# Patient Record
Sex: MALE | Race: OTHER | NOT HISPANIC OR LATINO | Employment: FULL TIME | URBAN - METROPOLITAN AREA
[De-identification: names, ages, dates, MRNs, and addresses within clinical notes are randomized per-mention and may not be internally consistent; named-entity substitution may affect disease eponyms.]

---

## 2024-09-18 ENCOUNTER — OFFICE VISIT (OUTPATIENT)
Dept: URGENT CARE | Facility: CLINIC | Age: 24
End: 2024-09-18
Payer: COMMERCIAL

## 2024-09-18 VITALS
WEIGHT: 201 LBS | SYSTOLIC BLOOD PRESSURE: 122 MMHG | HEART RATE: 80 BPM | TEMPERATURE: 97.5 F | HEIGHT: 70 IN | RESPIRATION RATE: 19 BRPM | BODY MASS INDEX: 28.77 KG/M2 | OXYGEN SATURATION: 100 % | DIASTOLIC BLOOD PRESSURE: 80 MMHG

## 2024-09-18 DIAGNOSIS — H93.11 TINNITUS OF RIGHT EAR: ICD-10-CM

## 2024-09-18 DIAGNOSIS — R55 VASOVAGAL SYNCOPE: ICD-10-CM

## 2024-09-18 DIAGNOSIS — H93.8X1 EAR FULLNESS, RIGHT: ICD-10-CM
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
## 2024-09-18 PROCEDURE — 3079F DIAST BP 80-89 MM HG: CPT | Performed by: STUDENT IN AN ORGANIZED HEALTH CARE EDUCATION/TRAINING PROGRAM

## 2024-09-18 PROCEDURE — 3074F SYST BP LT 130 MM HG: CPT | Performed by: STUDENT IN AN ORGANIZED HEALTH CARE EDUCATION/TRAINING PROGRAM

## 2024-09-18 PROCEDURE — 99204 OFFICE O/P NEW MOD 45 MIN: CPT | Performed by: STUDENT IN AN ORGANIZED HEALTH CARE EDUCATION/TRAINING PROGRAM

## 2024-09-18 RX ORDER — DEXAMETHASONE SODIUM PHOSPHATE 10 MG/ML
10 INJECTION INTRAMUSCULAR; INTRAVENOUS ONCE
Status: COMPLETED | OUTPATIENT
Start: 2024-09-18 | End: 2024-09-18

## 2024-09-18 RX ADMIN — DEXAMETHASONE SODIUM PHOSPHATE 10 MG: 10 INJECTION INTRAMUSCULAR; INTRAVENOUS at 19:10

## 2024-09-19 NOTE — PROGRESS NOTES
"Subjective:   Librado England is a 24 y.o. male who presents for Ear Pain (This morning ) and Spider Bite (Today )      HPI:  24-year-old male presents to urgent care with his mother for numerous bug bites to the right side of his neck and right shoulder that he noticed today.  States that they are very itchy.  He states he did stay at the [facility] hotel about 1 week ago.  Does not notice any bedbugs in his house.  He does play volleyball and was exposed to grass and did dive into the grass within the past few days.  States he did have some fullness sensation to the right ear and was not able to pop the ear with swallowing and chewing gum.  Eventually the ear did pop up but he is having some ringing to the right ear that is constant.  Not experiencing any fever, chills, blurred vision, double vision, headache, shortness of breath, chest pain.  Does report that he did have a syncopal episode yesterday before any of the symptoms started.  He was at [employer] training class and saw some graphic images and passed out during that.  He has not had any syncopal episode today or felt lightheaded.    Medications:    dexamethasone    Allergies: Patient has no known allergies.    Problem List: Librado England does not have a problem list on file.    Surgical History:  No past surgical history on file.    Past Social Hx: Librado England  reports that he has never smoked. He has never used smokeless tobacco. He reports that he does not drink alcohol and does not use drugs.     Past Family Hx:  Librado England family history is not on file.     Problem list, medications, and allergies reviewed by myself today in Epic.     Objective:     /80   Pulse 80   Temp 36.4 °C (97.5 °F) (Temporal)   Resp 19   Ht 1.778 m (5' 10\")   Wt 91.2 kg (201 lb)   SpO2 100%   BMI 28.84 kg/m²     Physical Exam  Vitals reviewed.   Constitutional:       General: He is not in acute distress.  HENT:      Right Ear: Tympanic membrane, ear canal and external ear " normal. No middle ear effusion. Tympanic membrane is not perforated, erythematous or bulging.      Left Ear: Tympanic membrane, ear canal and external ear normal.  No middle ear effusion. Tympanic membrane is not perforated, erythematous or bulging.   Cardiovascular:      Rate and Rhythm: Normal rate and regular rhythm.      Pulses: Normal pulses.      Heart sounds: Normal heart sounds. No murmur heard.  Pulmonary:      Effort: Pulmonary effort is normal. No respiratory distress.      Breath sounds: Normal breath sounds. No stridor. No wheezing, rhonchi or rales.   Skin:            Comments: Approximately 12-15 bug bites to the right side of the neck, right shoulder, and right upper arm.  Bug bites are distributed in a pattern of 3 no fluctuance or induration.  Subjective pruritus.   Neurological:      General: No focal deficit present.      Mental Status: He is alert.         Assessment/Plan:     Diagnosis and associated orders:     1. Bug bite, initial encounter  dexamethasone (Decadron) injection (check route below) 10 mg      2. Ear fullness, right  Referral to ENT      3. Tinnitus of right ear  Referral to ENT      4. Vasovagal syncope           Comments/MDM:     Patient's physical exam findings show numerous bug bites to the right neck, right shoulder, and right upper arm.  Bug bites are distributed in a pattern of 3.  High suspicion for possible bedbugs especially given recent hotel stay.  Advised to look for bedbugs at his house.  May also be due to recent volleyball and exposure to grass which could raise chance of other bug bites unrelated to bedbugs.  He did have a vasovagal syncopal episode yesterday when looking at graphic images during an [employer] training class.  Low suspicion for underlying pathology causing his syncope.  He does have some tinnitus to right ear with unclear etiology.  Presentation and reported findings of not able to pop his ear consistent with possible underlying eustachian tube  dysfunction.  Advised Mucinex for the next 3 to 5 days.  Single dose Decadron given in clinic for bug bites.  Not having any vertigo symptoms currently.  No red flag signs right now.  Vitals are stable.  He is given strict ED precautions.  Advised to present to emergency department with any new near syncopal episode, worsening dizziness, or worsening of the symptoms.         Differential diagnosis, natural history, supportive care, and indications for immediate follow-up discussed.    Advised the patient to follow-up with the primary care physician for recheck, reevaluation, and consideration of further management.    Please note that this dictation was created using voice recognition software. I have made a reasonable attempt to correct obvious errors, but I expect that there are errors of grammar and possibly content that I did not discover before finalizing the note.    Electronically signed by London Gr PA-C.